# Patient Record
Sex: FEMALE | URBAN - METROPOLITAN AREA
[De-identification: names, ages, dates, MRNs, and addresses within clinical notes are randomized per-mention and may not be internally consistent; named-entity substitution may affect disease eponyms.]

---

## 2019-10-23 ENCOUNTER — APPOINTMENT (RX ONLY)
Dept: URBAN - METROPOLITAN AREA CLINIC 11 | Facility: CLINIC | Age: 29
Setting detail: DERMATOLOGY
End: 2019-10-23

## 2019-10-23 DIAGNOSIS — Z41.1 ENCOUNTER FOR COSMETIC SURGERY: ICD-10-CM

## 2019-10-23 PROCEDURE — ? ABDOMINOPLASTY

## 2019-10-23 NOTE — PROCEDURE: ABDOMINOPLASTY
Detail Level: Zone
Tumescent Anesthesia Volume In Cc: 0
Hemostasis: electrocautery
Estimated Blood Loss (Cc): minimal
Surgeon: Rodolfo
Ricky's Fascia Deep Sutures: Stratafix PDS +
Dermal Sutures: 2-0
Dermal Sutures: Vicryl
Epidermal Sutures: 3-0
Epidermal Sutures: Stratafix Moncryl +
Epidermal Closure: running subcuticular
Consent: The risks, benefits, expectations and alternatives of abdominoplasty were discussed with the patient including, but not limited to, infection, bleeding, injury to nerves, blood vessels or other structures, injury to abdominal viscera, seroma, deep venous thrombosis, delayed healing, visible scarring, contour irregularities, asymmetry, loss of umbilicus, cosmetic dissatisfaction and unplanned return to the operating room. The patient read and signed the consent form and verbalized full understanding. A time-out was performed confirming the identities of the patient and surgeon, the operative site(s) and the operative plan per the informed consent.
Positioning: The patient was brought to the operating room and placed in the standard supine position in the usual manner. After placement of monitors, anesthesia was achieved as above uneventfully.

## 2022-06-06 ENCOUNTER — APPOINTMENT (RX ONLY)
Dept: URBAN - METROPOLITAN AREA CLINIC 11 | Facility: CLINIC | Age: 32
Setting detail: DERMATOLOGY
End: 2022-06-06